# Patient Record
Sex: MALE | Race: WHITE | Employment: FULL TIME | URBAN - METROPOLITAN AREA
[De-identification: names, ages, dates, MRNs, and addresses within clinical notes are randomized per-mention and may not be internally consistent; named-entity substitution may affect disease eponyms.]

---

## 2021-04-12 ENCOUNTER — OFFICE VISIT (OUTPATIENT)
Dept: FAMILY MEDICINE CLINIC | Facility: CLINIC | Age: 41
End: 2021-04-12
Payer: COMMERCIAL

## 2021-04-12 VITALS
SYSTOLIC BLOOD PRESSURE: 136 MMHG | OXYGEN SATURATION: 97 % | HEIGHT: 66 IN | HEART RATE: 87 BPM | DIASTOLIC BLOOD PRESSURE: 70 MMHG | BODY MASS INDEX: 33.27 KG/M2 | WEIGHT: 207 LBS

## 2021-04-12 DIAGNOSIS — R63.5 WEIGHT GAIN: Primary | ICD-10-CM

## 2021-04-12 DIAGNOSIS — Z00.00 PREVENTATIVE HEALTH CARE: ICD-10-CM

## 2021-04-12 DIAGNOSIS — Z83.3 FAMILY HISTORY OF DIABETES MELLITUS (DM): ICD-10-CM

## 2021-04-12 PROCEDURE — 99204 OFFICE O/P NEW MOD 45 MIN: CPT | Performed by: INTERNAL MEDICINE

## 2021-04-12 PROCEDURE — 3078F DIAST BP <80 MM HG: CPT | Performed by: INTERNAL MEDICINE

## 2021-04-12 PROCEDURE — 3075F SYST BP GE 130 - 139MM HG: CPT | Performed by: INTERNAL MEDICINE

## 2021-04-12 PROCEDURE — 3008F BODY MASS INDEX DOCD: CPT | Performed by: INTERNAL MEDICINE

## 2021-04-17 ENCOUNTER — LAB ENCOUNTER (OUTPATIENT)
Dept: LAB | Facility: HOSPITAL | Age: 41
End: 2021-04-17
Attending: INTERNAL MEDICINE
Payer: COMMERCIAL

## 2021-04-17 DIAGNOSIS — R63.5 WEIGHT GAIN: ICD-10-CM

## 2021-04-17 DIAGNOSIS — Z00.00 PREVENTATIVE HEALTH CARE: ICD-10-CM

## 2021-04-17 PROCEDURE — 85025 COMPLETE CBC W/AUTO DIFF WBC: CPT | Performed by: INTERNAL MEDICINE

## 2021-04-17 PROCEDURE — 80061 LIPID PANEL: CPT | Performed by: INTERNAL MEDICINE

## 2021-04-17 PROCEDURE — 84439 ASSAY OF FREE THYROXINE: CPT

## 2021-04-17 PROCEDURE — 36415 COLL VENOUS BLD VENIPUNCTURE: CPT | Performed by: INTERNAL MEDICINE

## 2021-04-17 PROCEDURE — 80053 COMPREHEN METABOLIC PANEL: CPT

## 2021-04-17 PROCEDURE — 84443 ASSAY THYROID STIM HORMONE: CPT

## 2021-04-17 PROCEDURE — 83036 HEMOGLOBIN GLYCOSYLATED A1C: CPT | Performed by: INTERNAL MEDICINE

## 2021-04-28 ENCOUNTER — TELEPHONE (OUTPATIENT)
Dept: FAMILY MEDICINE CLINIC | Facility: CLINIC | Age: 41
End: 2021-04-28

## 2021-04-28 NOTE — TELEPHONE ENCOUNTER
Results were discussed with patient and he is aware he has to follow a stricter diet and exercise more, Omega 3 was also recommended and he verbalized understanding.      ----- Message from Javad Jose MD sent at 4/26/2021  8:55 AM CDT -----  Please

## 2021-04-28 NOTE — PROGRESS NOTES
[facility]  New Patient History and Physical      HPI:   Patient presents with:  Establish Care  Physical      Neymar Flores is a 42year old male presenting for:  establishment of care. Has  has no past medical history on file. 06:46 AM    CA 9.0 04/17/2021 06:46 AM    GLU 94 04/17/2021 06:46 AM    TP 7.6 04/17/2021 06:46 AM    ALB 4.0 04/17/2021 06:46 AM    ALKPHO 92 04/17/2021 06:46 AM    AST 20 04/17/2021 06:46 AM    ALT 43 04/17/2021 06:46 AM    BILT 1.0 04/17/2021 06:46 AM stool, constipation and nausea. Endocrine: Negative for cold intolerance, heat intolerance and polyuria. Genitourinary: Negative for dysuria, hematuria and urgency.    Musculoskeletal: Negative for arthralgias, back pain, gait problem, joint swelling, m Bowel sounds are normal. There is no distension. Palpations: Abdomen is soft. There is no mass. Tenderness: There is no abdominal tenderness. There is no guarding or rebound. Musculoskeletal:         General: No tenderness.  Normal range of rosy above recommendations and agrees to the above plan. Reasurrance and education provided. All questions answered.   Notified to call with any questions, complications, allergies, or worsening or changing symptoms as well as any side effects or complications

## 2024-10-14 ENCOUNTER — OFFICE VISIT (OUTPATIENT)
Dept: INTERNAL MEDICINE CLINIC | Facility: CLINIC | Age: 44
End: 2024-10-14
Payer: COMMERCIAL

## 2024-10-14 VITALS
HEIGHT: 66 IN | DIASTOLIC BLOOD PRESSURE: 88 MMHG | SYSTOLIC BLOOD PRESSURE: 136 MMHG | WEIGHT: 218 LBS | OXYGEN SATURATION: 98 % | BODY MASS INDEX: 35.03 KG/M2 | HEART RATE: 84 BPM

## 2024-10-14 DIAGNOSIS — Z00.00 HEALTH MAINTENANCE EXAMINATION: Primary | ICD-10-CM

## 2024-10-14 DIAGNOSIS — R06.83 SNORING: ICD-10-CM

## 2024-10-14 DIAGNOSIS — E66.812 CLASS 2 OBESITY DUE TO EXCESS CALORIES WITHOUT SERIOUS COMORBIDITY WITH BODY MASS INDEX (BMI) OF 35.0 TO 35.9 IN ADULT: ICD-10-CM

## 2024-10-14 DIAGNOSIS — E66.09 CLASS 2 OBESITY DUE TO EXCESS CALORIES WITHOUT SERIOUS COMORBIDITY WITH BODY MASS INDEX (BMI) OF 35.0 TO 35.9 IN ADULT: ICD-10-CM

## 2024-10-14 NOTE — PATIENT INSTRUCTIONS
PATIENT INSTRUCTIONS    Thank you for seeing me today, it was a pleasure taking care of you.  Please check out at the  and schedule a follow up appointment.  Return in about 1 year (around 10/14/2025) for physical.  Please remember that the betty period for all appointments is 5 minutes. This is to help maximize the amount of time that we can spend together at our visits.    Please get your labs drawn - you may need to schedule a lab appointment if this was not completed at your recent doctor's visit.  The following imaging studies were ordered: None  Please also follow up with the following specialists: GI - colonoscopy, pulmonology - sleep  Please fill out the advance directive information (power of  documents) and bring a copy to our clinic.  Consider flu and COVID vaccines  Focus on weight loss  Portion control - monitor how much you are eating and try to cut back.  Do what you can to make healthy choices (as an example, choose water over soda).  You can try apps such a MyFitnessPal or Weight Watchers to help you monitor your diet and exercise habits.  Exercise - increase your intensity level slowly if you do not exercise regularly. Ideally your goal should eventually be to be moving at a pace where it is hard to have a conversation with someone.    Best,   Dr. Damien [place] LABS AND IMAGING INFORMATION    Here are some lab and imaging locations available to you. Please note that some of the times and availabilities are subject to change. Please refer to the  [employer] webpage for the most recent updates. There are also additional locations that can be found on the  [employer] webpage.    To schedule a lab appointment, please call [phone].    To schedule an imaging appointment, please call [phone], option 2      [facility] - [address] [address]    Lab Hours  Monday - Friday 7:30am - 5pm  Saturday 7:30am -  4pm    X-ray Hours  Monday - Friday 8am - 8pm  Sat, Sun & holidays 8am - 4:30pm      [facility]  [address] [place] [address]    Lab Hours  Monday - Friday 6:30am - 8pm  Saturday 6:30am - 3pm  Sunday 7:30am - 4pm    X-ray Hours  Monday - Friday 7am - 8pm  Saturday 7am - 3:30pm      [facility]  [address] [place] [address]    Lab Hours  Monday - Friday 7:30am - 5pm    X-ray Hours  None at this location      [facility] & [address] [address]    Lab Hours  Lab services temporarily unavailable    X-ray Hours  Monday - Friday 8am - 4:30pm      [facility] [place]  [address] [place] [address]    Lab Hours  Monday - Friday 7:30am - 5pm  Saturday 7:30am - 4pm    X-ray Hours  Monday - Friday 8am - 8pm  Sat, Sun & holidays 8am - 4pm      [facility]  [address] [address]  [place] [address]    Lab Hours  Monday - Friday 7:30am - 4pm    X-ray Hours  Monday - Friday 8am - 4:30pm      [facility]  [address] [facility] [address]  [place] [address]    Lab Hours  Monday - Friday 8am - 4:30pm    X-ray Hours  None at this location        Quitting Smoking    Quitting smoking is the most important step you can take to improve your health. We're glad you have set a goal to improve your health.    Quit Smoking Resources    In addition to medications, use the STAR plan to help you successfully quit.   Stick with your quit date!   Tell friends, family, and coworkers your quit date. Request their understanding and support.  Anticipate and prepare for challenges. Some examples are withdrawal symptoms, being around others who smoke, and drinking alcohol.  Remove all tobacco products and paraphernalia from your environment. Make your home and  vehicles smoke-free.    Free resources for additional support:  National tobacco quitline: 1-800-QUIT-NOW (1-158.124.7613).  SmokefreeTXT is a free text program to assist you in quitting. Visit https://www.smokefree.gov/smokefreetxt for more information.  Feel free to call your care manager at ([phone]) for additional support.

## 2024-10-14 NOTE — ASSESSMENT & PLAN NOTE
Exercise and diet advised.  CBC, CMP, lipid panel, Hba1c, TSH, HIV screen, hepatitis C screen  Tdap today.  Flu shot declined  COVID vaccine advised.  Advanced directive information provided.  Colonoscopy referral provided.  Cigar smoking cessation advised

## 2024-10-14 NOTE — PROGRESS NOTES
FAMILY MEDICINE CLINIC NOTE    HPI  Walker Selby is a 44 year old male presenting for physical    #Health Maintenance  -Diet: Eats well. Balanced diet. Does have a sweet tooth. Tries not to eat out too much.   -Exercise: Active at work, some yard work.   -Lung cancer screen: Not indicated  -Colon cancer screen: Indicated  -Prostate cancer screen: Not indicated - does have family history, but prefers to think more about it at 50.   -Aortic aneurysm screen: Not indicated  -Statin:  Will check lipid panel  -ASA: Not indicated  -HIV screen: Indicated  -Hep C screen: Indicated  -Gonorrhea/chlamydia:  Not indicated  -Syphillis: Not indicated  -TB: Not indicated  -Tobacco/alcohol: Per below  -Depression: PHQ-2 score of 0 (score >/= 3 do PHQ-9)  -Advanced Directive: Indicated    #Immunizations  -Tdap: Indicated  -Flu shot: Indicated - declines  -PCV13: Not indicated   -PCV20: Not indicated   -PPSV23: Not indicated   -HPV: Not indicated  -RZV (preferred) or VZL: Not indicated   -RSV: Not indicated   -COVID: Indicated    #Snoring  S: (snoring) Yes  T: (tired/fatigued during daytime) Yes  O: (observed stop breathing/gasp @ night) Yes  P: (pressure=HTN) No  B: (BMI>35) Yes  A: (age>50) No  N: (neck size M >17 inch collar or 43cm, F >16 inch or 41cm) Yes  G: (Male) Yes  DEEPAK Screenin-8 High risk of DEEPAK      #Patient Care Team  Patient Care Team:  Michi Quezada MD as PCP - General (Family Medicine)    ROS  GENERAL: No fever/chills, no recent weight loss   HEENT: No visual changes, no changes in hearing, no sore throats  NECK: No pain, no swelling  RESP: No cough, no SOB  CV: No chest pain, no palpitations  GI: No abd pain, no N/V/D  MSK: No edema, no pain  SKIN: No new rashes  NEURO: No numbness, no tingling, no HA    HEALTH MAINTENANCE CHECKLIST  Health Maintenance Topics with due status: Overdue       Topic Date Due    Annual Physical Never done    Colorectal Cancer Screening Never done    DTaP,Tdap,and Td Vaccines  Never done    Annual Depression Screening Never done    COVID-19 Vaccine Never done    Influenza Vaccine Never done       ALLERGIES  Allergies[1]    MEDICATIONS  No current outpatient medications on file.       ACTIVE PROBLEM  Patient Active Problem List   Diagnosis    Health maintenance examination    Class 2 obesity due to excess calories without serious comorbidity with body mass index (BMI) of 35.0 to 35.9 in adult    Snoring       PAST MEDICAL HISTORY  History reviewed. No pertinent past medical history.    PAST SOCIAL HISTORY  Social History     Socioeconomic History    Marital status:      Spouse name: Not on file    Number of children: Not on file    Years of education: Not on file    Highest education level: Not on file   Occupational History    Not on file   Tobacco Use    Smoking status: Some Days     Types: Cigars    Smokeless tobacco: Never   Vaping Use    Vaping status: Never Used   Substance and Sexual Activity    Alcohol use: Yes     Comment: Occasional - once a month    Drug use: No    Sexual activity: Yes     Partners: Female   Other Topics Concern    Caffeine Concern Not Asked    Exercise Not Asked    Seat Belt Not Asked    Special Diet Not Asked    Stress Concern Not Asked    Weight Concern Not Asked   Social History Narrative    Relationships: Wife - Julia     Children: Daughter - Maggie (SARMAD STOUT), Harshil Lee     Pets: 1 Dog    School: N/A    Work:  - half on computer, half on feet - [employer]    Origin: From the [facility]. [place] background.    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     Spiritual: Raised Jewish.      Social Drivers of Health     Financial Resource Strain: Not on file   Food Insecurity: Not on file   Transportation Needs: Not on file   Physical Activity: Not on file   Stress: Not on file   Social Connections: Not on file   Housing Stability: Not on file       PAST  SURGICAL HISTORY  History reviewed. No pertinent surgical history.    PAST FAMILY HISTORY  Family History   Problem Relation Age of Onset    Heart Disease Mother         CABG    Hypertension Mother     Hyperlipidemia Mother     Other (Kidney stones) Father     No Known Problems Sister     Diabetes Maternal Grandmother     Diabetes Maternal Grandfather     Hypertension Paternal Grandmother     Diabetes Paternal Grandmother     Alcohol abuse Paternal Grandfather     Diabetes Maternal Aunt     Colon Cancer Paternal Uncle     Prostate Cancer Cousin     Breast Cancer Paternal Aunt        PHYSICAL EXAM  Vitals:    10/14/24 1345   BP: 136/88   Pulse: 84   SpO2: 98%   Weight: 218 lb (98.9 kg)   Height: 5' 6\" (1.676 m)      Body mass index is 35.19 kg/m².    GENERAL: NAD  HEENT: Moist mucous membranes, no tonsillar swelling or erythema, PERRLA bilat, TM translucent and non-bulging  NECK: Supple, non-tender  RESP: CTAB, no wheezing, no rales, no rhonchi  CV: RRR, no murmurs  GI: Soft, non-distended, non-tender, no guarding, no rebound, no masses  MSK: No edema  SKIN: Warm and dry, no rashes  NEURO: Answering questions appropriately    LABS  Lab Results   Component Value Date    WBC 8.3 04/17/2021    HGB 16.0 04/17/2021    HCT 49.1 04/17/2021    .0 04/17/2021    NEPERCENT 64.1 04/17/2021    LYPERCENT 20.8 04/17/2021    MOPERCENT 11.6 04/17/2021    EOPERCENT 2.5 04/17/2021    BAPERCENT 0.5 04/17/2021    NE 5.32 04/17/2021    LYMABS 1.73 04/17/2021    MOABSO 0.96 04/17/2021    EOABSO 0.21 04/17/2021    BAABSO 0.04 04/17/2021       Lab Results   Component Value Date     04/17/2021    K 4.2 04/17/2021     04/17/2021    CO2 30.0 04/17/2021    ANIONGAP 4 04/17/2021    BUN 15 04/17/2021    CREATSERUM 0.77 04/17/2021    BUNCREA 19.5 04/17/2021    GLU 94 04/17/2021    CA 9.0 04/17/2021    OSMOCALC 295 04/17/2021    GFRNAA 112 04/17/2021    GFRAA 129 04/17/2021    ALT 43 04/17/2021    AST 20 04/17/2021    ALKPHO 92  04/17/2021    BILT 1.0 04/17/2021    TP 7.6 04/17/2021    ALB 4.0 04/17/2021    GLOBULIN 3.6 04/17/2021    ELECTAG 1.1 04/17/2021    FASTING Yes 04/17/2021    FASTING Yes 04/17/2021         Lab Results   Component Value Date    CHOLEST 190 04/17/2021    TRIG 175 (H) 04/17/2021    HDL 38 (L) 04/17/2021     (H) 04/17/2021    VLDL 35 (H) 04/17/2021    NONHDLC 152 (H) 04/17/2021    CALCNONHDL 179 (H) 08/19/2016        DIAGNOSTICS    ASSESSMENT/PLAN  Problem List Items Addressed This Visit          Endocrine and Metabolic    Class 2 obesity due to excess calories without serious comorbidity with body mass index (BMI) of 35.0 to 35.9 in adult     Healthy diet and exercise advised.   Check labs         Relevant Orders    Comp Metabolic Panel (14)    Hemoglobin A1C    Lipid Panel    TSH W Reflex To Free T4       Sleep    Snoring     Patient with snoring and apneic episodes.  Referral to pulmonology provided         Relevant Orders    PULMONARY - INTERNAL       Health Encounters    Health maintenance examination - Primary     Exercise and diet advised.  CBC, CMP, lipid panel, Hba1c, TSH, HIV screen, hepatitis C screen  Tdap today.  Flu shot declined  COVID vaccine advised.  Advanced directive information provided.  Colonoscopy referral provided.  Cigar smoking cessation advised         Relevant Orders    CBC With Differential With Platelet    Comp Metabolic Panel (14)    HCV Antibody    Hemoglobin A1C    HIV Ag/Ab Combo    Lipid Panel    TSH W Reflex To Free T4    EE GI Telephone Colon Screen    TETANUS, DIPHTHERIA TOXOIDS AND ACELLULAR PERTUSIS VACCINE (TDAP), >7 YEARS, IM USE    Tobacco Use Counseling 3-10 Min [47991]     Tobacco cessation counseling for <3 minutes.    Return in about 1 year (around 10/14/2025) for physical.    Michi Quezada MD  Family Medicine         [1] No Known Allergies

## 2024-10-19 ENCOUNTER — LAB ENCOUNTER (OUTPATIENT)
Dept: LAB | Facility: HOSPITAL | Age: 44
End: 2024-10-19
Attending: FAMILY MEDICINE
Payer: COMMERCIAL

## 2024-10-19 DIAGNOSIS — Z00.00 HEALTH MAINTENANCE EXAMINATION: ICD-10-CM

## 2024-10-19 DIAGNOSIS — E66.812 CLASS 2 OBESITY DUE TO EXCESS CALORIES WITHOUT SERIOUS COMORBIDITY WITH BODY MASS INDEX (BMI) OF 35.0 TO 35.9 IN ADULT: ICD-10-CM

## 2024-10-19 DIAGNOSIS — E66.09 CLASS 2 OBESITY DUE TO EXCESS CALORIES WITHOUT SERIOUS COMORBIDITY WITH BODY MASS INDEX (BMI) OF 35.0 TO 35.9 IN ADULT: ICD-10-CM

## 2024-10-19 LAB
ALBUMIN SERPL-MCNC: 4.7 G/DL (ref 3.2–4.8)
ALBUMIN/GLOB SERPL: 1.6 {RATIO} (ref 1–2)
ALP LIVER SERPL-CCNC: 98 U/L
ALT SERPL-CCNC: 73 U/L
ANION GAP SERPL CALC-SCNC: 7 MMOL/L (ref 0–18)
AST SERPL-CCNC: 37 U/L (ref ?–34)
BASOPHILS # BLD AUTO: 0.04 X10(3) UL (ref 0–0.2)
BASOPHILS NFR BLD AUTO: 0.5 %
BILIRUB SERPL-MCNC: 1 MG/DL (ref 0.3–1.2)
BUN BLD-MCNC: 12 MG/DL (ref 9–23)
BUN/CREAT SERPL: 15.4 (ref 10–20)
CALCIUM BLD-MCNC: 9.5 MG/DL (ref 8.7–10.4)
CHLORIDE SERPL-SCNC: 108 MMOL/L (ref 98–112)
CHOLEST SERPL-MCNC: 210 MG/DL (ref ?–200)
CO2 SERPL-SCNC: 29 MMOL/L (ref 21–32)
CREAT BLD-MCNC: 0.78 MG/DL
DEPRECATED RDW RBC AUTO: 42.8 FL (ref 35.1–46.3)
EGFRCR SERPLBLD CKD-EPI 2021: 112 ML/MIN/1.73M2 (ref 60–?)
EOSINOPHIL # BLD AUTO: 0.18 X10(3) UL (ref 0–0.7)
EOSINOPHIL NFR BLD AUTO: 2.3 %
ERYTHROCYTE [DISTWIDTH] IN BLOOD BY AUTOMATED COUNT: 13.1 % (ref 11–15)
EST. AVERAGE GLUCOSE BLD GHB EST-MCNC: 114 MG/DL (ref 68–126)
FASTING PATIENT LIPID ANSWER: YES
FASTING STATUS PATIENT QL REPORTED: YES
GLOBULIN PLAS-MCNC: 2.9 G/DL (ref 2–3.5)
GLUCOSE BLD-MCNC: 100 MG/DL (ref 70–99)
HBA1C MFR BLD: 5.6 % (ref ?–5.7)
HCT VFR BLD AUTO: 47.6 %
HCV AB SERPL QL IA: NONREACTIVE
HDLC SERPL-MCNC: 36 MG/DL (ref 40–59)
HGB BLD-MCNC: 16.4 G/DL
IMM GRANULOCYTES # BLD AUTO: 0.04 X10(3) UL (ref 0–1)
IMM GRANULOCYTES NFR BLD: 0.5 %
LDLC SERPL CALC-MCNC: 136 MG/DL (ref ?–100)
LYMPHOCYTES # BLD AUTO: 1.72 X10(3) UL (ref 1–4)
LYMPHOCYTES NFR BLD AUTO: 22 %
MCH RBC QN AUTO: 30.4 PG (ref 26–34)
MCHC RBC AUTO-ENTMCNC: 34.5 G/DL (ref 31–37)
MCV RBC AUTO: 88.3 FL
MONOCYTES # BLD AUTO: 0.87 X10(3) UL (ref 0.1–1)
MONOCYTES NFR BLD AUTO: 11.1 %
NEUTROPHILS # BLD AUTO: 4.96 X10 (3) UL (ref 1.5–7.7)
NEUTROPHILS # BLD AUTO: 4.96 X10(3) UL (ref 1.5–7.7)
NEUTROPHILS NFR BLD AUTO: 63.6 %
NONHDLC SERPL-MCNC: 174 MG/DL (ref ?–130)
OSMOLALITY SERPL CALC.SUM OF ELEC: 298 MOSM/KG (ref 275–295)
PLATELET # BLD AUTO: 230 10(3)UL (ref 150–450)
POTASSIUM SERPL-SCNC: 4.3 MMOL/L (ref 3.5–5.1)
PROT SERPL-MCNC: 7.6 G/DL (ref 5.7–8.2)
RBC # BLD AUTO: 5.39 X10(6)UL
SODIUM SERPL-SCNC: 144 MMOL/L (ref 136–145)
TRIGL SERPL-MCNC: 208 MG/DL (ref 30–149)
TSI SER-ACNC: 2.66 MIU/ML (ref 0.55–4.78)
VLDLC SERPL CALC-MCNC: 39 MG/DL (ref 0–30)
WBC # BLD AUTO: 7.8 X10(3) UL (ref 4–11)

## 2024-10-19 PROCEDURE — 80050 GENERAL HEALTH PANEL: CPT | Performed by: FAMILY MEDICINE

## 2024-10-19 PROCEDURE — 83036 HEMOGLOBIN GLYCOSYLATED A1C: CPT | Performed by: FAMILY MEDICINE

## 2024-10-19 PROCEDURE — 87389 HIV-1 AG W/HIV-1&-2 AB AG IA: CPT | Performed by: FAMILY MEDICINE

## 2024-10-19 PROCEDURE — 86803 HEPATITIS C AB TEST: CPT | Performed by: FAMILY MEDICINE

## 2024-10-19 PROCEDURE — 80061 LIPID PANEL: CPT | Performed by: FAMILY MEDICINE

## 2024-10-21 PROBLEM — R74.8 ELEVATED LIVER ENZYMES: Status: ACTIVE | Noted: 2024-10-21

## 2024-10-21 PROBLEM — E78.5 HYPERLIPIDEMIA: Status: ACTIVE | Noted: 2024-10-21

## 2024-10-28 ENCOUNTER — NURSE ONLY (OUTPATIENT)
Facility: CLINIC | Age: 44
End: 2024-10-28

## 2024-10-28 DIAGNOSIS — Z12.11 SCREEN FOR COLON CANCER: Primary | ICD-10-CM

## 2024-10-28 DIAGNOSIS — Z80.0 FAMILY HISTORY OF COLON CANCER: ICD-10-CM

## 2024-10-28 NOTE — PROGRESS NOTES
Called patient for scheduled TCS.   Medications, pharmacy, and allergies reviewed.   Age 45-76 y/o: Yes  › MD preference: None  › Insurance:  BC HMO  › Last PCP visit: 10/14/2024  Pcp within [facility]: Dr. Quezada  › Last CBC: 10/19/2024  › Date of positive FIT: N/A  › H/W/BMI: 5'6 / 218 lb / 35.2 BMI    Special comments/notes: None  Telephone Colon Screening Questionnaire Yes No   Are you currently experiencing any new/abnormal GI symptoms? [] [x]   If yes, explain:     Rectal bleeding? [] [x]   Black stool? [] [x]   Dysphagia or food \"feeling stuck\" when eating? [] [x]   Intractable vomiting? [] [x]   Unexplained weight loss? [] [x]   First colonoscopy? [x] []   Family history of colon cancer?  Paternal uncle  [x] []   Any issues with anesthesia? [] [x]   If yes, explain:      Any recent complaints of chest pain or shortness of breath?  [] [x]   Referred to a cardiologist?  [] [x]   If yes, explain:      Stroke, MI (heart attack) or stent placement in the last 12 months:  [] [x]   History of  respiratory issues/oxygen/DEEPAK/COPD: [] [x]   If yes, CPAP/BiPAP:     History of devices (pacemaker/defibrillator) [] [x]   History of cardiac/CVA/(MI/stroke): [] [x]     Medications Yes  No   Anticoagulants (except Aspirin):  [] [x]   Diabetic Meds  PO: Hold day before and day of procedure  Insulin:  [] [x]   Weight loss meds (phentermine/vyvanse/saxsenda/etc): [] [x]   Iron/herbal/multivitamin supplement: multivitamin [x] []   Usage of marijuana, CBD &/or vape products: [] [x]

## 2024-10-28 NOTE — PROGRESS NOTES
GI Staff:  TCS Colon Screening Orders    Please schedule: Colonoscopy [address] with MAC    Please send split dose Golytely bowel prep - needs to be sent once scheduled     Diagnosis: Colon Screening Z12.11 / Family history of colon cancer Z80.0     Medication adjustments: Hold vitamins/supplements for 7 days prior to procedure.      >>>Please inform patient if new medications are started after scheduling procedure they need to call clinic to notify us.

## 2024-10-29 NOTE — PROGRESS NOTES
Tamica Villafuerte's        Please send prep       [id number] - [place], [place] - [address] AT [facility], [phone], [phone]   [address] [place] [address]   Phone: [phone] Fax: [phone]

## 2024-10-29 NOTE — PROGRESS NOTES
Scheduled for:  Colonoscopy [id number]  Provider Name:    Date:  Tues, 02/25/2025  Location:  [facility]  Sedation:  MAC  Time:  845AM (pt is aware Endo will call with arrival time     Prep:  Golytely   Meds/Allergies Reconciled?:  Yes    Diagnosis with codes:  Colon Screening Z12.11 / Family history of colon cancer Z80.0   Was patient informed to call insurance with codes (Y/N): Yes      Referral sent?:  Referral was sent at the time of electronic surgical scheduling.    [facility] or [facility] notified?:  I sent an electronic request to Endo Scheduling and received a confirmation today.       Medication Orders:  None  Misc Orders:  None     Further instructions given by staff:  I discussed the prep instructions with the patient which he verbally understood and is aware that I will send the instructions today.via [employer]

## 2025-02-18 RX ORDER — MULTIVITAMIN
1 TABLET ORAL DAILY
COMMUNITY

## 2025-02-25 ENCOUNTER — ANESTHESIA (OUTPATIENT)
Dept: ENDOSCOPY | Facility: HOSPITAL | Age: 45
End: 2025-02-25
Payer: COMMERCIAL

## 2025-02-25 ENCOUNTER — HOSPITAL ENCOUNTER (OUTPATIENT)
Facility: HOSPITAL | Age: 45
Setting detail: HOSPITAL OUTPATIENT SURGERY
Discharge: HOME | End: 2025-02-25
Attending: INTERNAL MEDICINE | Admitting: INTERNAL MEDICINE
Payer: COMMERCIAL

## 2025-02-25 ENCOUNTER — ANESTHESIA EVENT (OUTPATIENT)
Dept: ENDOSCOPY | Facility: HOSPITAL | Age: 45
End: 2025-02-25
Payer: COMMERCIAL

## 2025-02-25 DIAGNOSIS — Z80.0 FAMILY HISTORY OF COLON CANCER: ICD-10-CM

## 2025-02-25 DIAGNOSIS — Z12.11 SCREEN FOR COLON CANCER: ICD-10-CM

## 2025-02-25 PROBLEM — K63.5 POLYP OF COLON: Status: ACTIVE | Noted: 2025-02-25

## 2025-02-25 PROBLEM — K64.8 INTERNAL HEMORRHOIDS: Status: ACTIVE | Noted: 2025-02-25

## 2025-02-25 PROCEDURE — 45385 COLONOSCOPY W/LESION REMOVAL: CPT | Performed by: INTERNAL MEDICINE

## 2025-02-25 PROCEDURE — 0DBH8ZX EXCISION OF CECUM, VIA NATURAL OR ARTIFICIAL OPENING ENDOSCOPIC, DIAGNOSTIC: ICD-10-PCS | Performed by: INTERNAL MEDICINE

## 2025-02-25 PROCEDURE — 0DBK8ZX EXCISION OF ASCENDING COLON, VIA NATURAL OR ARTIFICIAL OPENING ENDOSCOPIC, DIAGNOSTIC: ICD-10-PCS | Performed by: INTERNAL MEDICINE

## 2025-02-25 PROCEDURE — 0DBL8ZX EXCISION OF TRANSVERSE COLON, VIA NATURAL OR ARTIFICIAL OPENING ENDOSCOPIC, DIAGNOSTIC: ICD-10-PCS | Performed by: INTERNAL MEDICINE

## 2025-02-25 RX ORDER — HYDROMORPHONE HYDROCHLORIDE 1 MG/ML
0.2 INJECTION, SOLUTION INTRAMUSCULAR; INTRAVENOUS; SUBCUTANEOUS EVERY 5 MIN PRN
Status: DISCONTINUED | OUTPATIENT
Start: 2025-02-25 | End: 2025-02-25

## 2025-02-25 RX ORDER — HYDROMORPHONE HYDROCHLORIDE 1 MG/ML
0.6 INJECTION, SOLUTION INTRAMUSCULAR; INTRAVENOUS; SUBCUTANEOUS EVERY 5 MIN PRN
Status: DISCONTINUED | OUTPATIENT
Start: 2025-02-25 | End: 2025-02-25

## 2025-02-25 RX ORDER — SODIUM CHLORIDE, SODIUM LACTATE, POTASSIUM CHLORIDE, CALCIUM CHLORIDE 600; 310; 30; 20 MG/100ML; MG/100ML; MG/100ML; MG/100ML
INJECTION, SOLUTION INTRAVENOUS CONTINUOUS
Status: DISCONTINUED | OUTPATIENT
Start: 2025-02-25 | End: 2025-02-25

## 2025-02-25 RX ORDER — MORPHINE SULFATE 4 MG/ML
4 INJECTION, SOLUTION INTRAMUSCULAR; INTRAVENOUS EVERY 10 MIN PRN
Status: DISCONTINUED | OUTPATIENT
Start: 2025-02-25 | End: 2025-02-25

## 2025-02-25 RX ORDER — MORPHINE SULFATE 4 MG/ML
2 INJECTION, SOLUTION INTRAMUSCULAR; INTRAVENOUS EVERY 10 MIN PRN
Status: DISCONTINUED | OUTPATIENT
Start: 2025-02-25 | End: 2025-02-25

## 2025-02-25 RX ORDER — NALOXONE HYDROCHLORIDE 0.4 MG/ML
0.08 INJECTION, SOLUTION INTRAMUSCULAR; INTRAVENOUS; SUBCUTANEOUS AS NEEDED
Status: DISCONTINUED | OUTPATIENT
Start: 2025-02-25 | End: 2025-02-25

## 2025-02-25 RX ORDER — HYDROMORPHONE HYDROCHLORIDE 1 MG/ML
0.4 INJECTION, SOLUTION INTRAMUSCULAR; INTRAVENOUS; SUBCUTANEOUS EVERY 5 MIN PRN
Status: DISCONTINUED | OUTPATIENT
Start: 2025-02-25 | End: 2025-02-25

## 2025-02-25 RX ORDER — MORPHINE SULFATE 10 MG/ML
6 INJECTION, SOLUTION INTRAMUSCULAR; INTRAVENOUS EVERY 10 MIN PRN
Status: DISCONTINUED | OUTPATIENT
Start: 2025-02-25 | End: 2025-02-25

## 2025-02-25 RX ADMIN — SODIUM CHLORIDE, SODIUM LACTATE, POTASSIUM CHLORIDE, CALCIUM CHLORIDE: 600; 310; 30; 20 INJECTION, SOLUTION INTRAVENOUS at 07:33:00

## 2025-02-25 NOTE — H&P
History & Physical Examination    Patient Name: Walker Selby  MRN: [id number]  CSN: [id number]  YOB: 1980    Diagnosis: screening for colon cancer    Prescriptions Prior to Admission[1]  Current Facility-Administered Medications   Medication Dose Route Frequency    lactated ringers infusion   Intravenous Continuous       Allergies: Allergies[2]    Past Medical History:    Hyperlipidemia    Visual impairment    contacts     History reviewed. No pertinent surgical history.  Family History   Problem Relation Age of Onset    Heart Disease Mother         CABG    Hypertension Mother     Hyperlipidemia Mother     Other (Kidney stones) Father     No Known Problems Sister     Diabetes Maternal Grandmother     Diabetes Maternal Grandfather     Hypertension Paternal Grandmother     Diabetes Paternal Grandmother     Alcohol abuse Paternal Grandfather     Diabetes Maternal Aunt     Colon Cancer Paternal Uncle     Prostate Cancer Cousin     Breast Cancer Paternal Aunt      Social History     Tobacco Use    Smoking status: Some Days     Types: Cigars    Smokeless tobacco: Never   Substance Use Topics    Alcohol use: Yes     Alcohol/week: 2.0 standard drinks of alcohol     Types: 2 Standard drinks or equivalent per week     Comment: Occasional - once a month       SYSTEM Check if Review is Normal Check if Physical Exam is Normal If not normal, please explain:   HEENT [X ] [ X]    NECK  [X ] [ X]    HEART [X ] [ X]    LUNGS [X ] [ X]    ABDOMEN [X ] [ X]    EXTREMITIES [X ] [ X]    OTHER        I have discussed the risks and benefits and alternatives of the procedure with the patient/family.  They understand and agree to proceed with plan of care.   I have reviewed the History and Physical done within the last 30 days.  Any changes noted above.    Mishel Taylor MD  [facility]                   [1]   Medications Prior to Admission   Medication Sig Dispense Refill Last Dose/Taking     Multiple Vitamin (ONE-DAILY MULTI VITAMINS) Oral Tab Take 1 tablet by mouth daily.   2/11/2025   [2] No Known Allergies

## 2025-02-25 NOTE — ANESTHESIA PREPROCEDURE EVALUATION
Anesthesia PreOp Note    HPI:     Walker Selby is a 45 year old male who presents for preoperative consultation requested by: Mishel Taylor MD    Date of Surgery: 2/25/2025    Procedure(s):  COLONOSCOPY  Indication: Screen for colon cancer / Family history of colon cancer    Relevant Problems   No relevant active problems       NPO:  Last Liquid Consumption Date: 02/25/25  Last Liquid Consumption Time: 0330  Last Solid Consumption Date: 02/24/25  Last Solid Consumption Time: 0900  Last Liquid Consumption Date: 02/25/25          History Review:  Patient Active Problem List    Diagnosis Date Noted    Elevated liver enzymes 10/21/2024    Hyperlipidemia 10/21/2024    Health maintenance examination 10/14/2024    Class 2 obesity due to excess calories without serious comorbidity with body mass index (BMI) of 35.0 to 35.9 in adult 10/14/2024    Snoring 10/14/2024       Past Medical History:    Hyperlipidemia    Visual impairment    contacts       History reviewed. No pertinent surgical history.    Prescriptions Prior to Admission[1]  Current Medications and Prescriptions Ordered in Epic[2]    Allergies[3]    Family History   Problem Relation Age of Onset    Heart Disease Mother         CABG    Hypertension Mother     Hyperlipidemia Mother     Other (Kidney stones) Father     No Known Problems Sister     Diabetes Maternal Grandmother     Diabetes Maternal Grandfather     Hypertension Paternal Grandmother     Diabetes Paternal Grandmother     Alcohol abuse Paternal Grandfather     Diabetes Maternal Aunt     Colon Cancer Paternal Uncle     Prostate Cancer Cousin     Breast Cancer Paternal Aunt      Social History     Socioeconomic History    Marital status:    Tobacco Use    Smoking status: Some Days     Types: Cigars    Smokeless tobacco: Never   Vaping Use    Vaping status: Never Used   Substance and Sexual Activity    Alcohol use: Yes     Alcohol/week: 2.0 standard drinks of alcohol     Types: 2  Standard drinks or equivalent per week     Comment: Occasional - once a month    Drug use: No    Sexual activity: Yes     Partners: Female       Available pre-op labs reviewed.             Vital Signs:  Body mass index is 35.19 kg/m².   height is 1.676 m (5' 6\") and weight is 98.9 kg (218 lb). His blood pressure is 147/88 and his pulse is 94. His respiration is 23 and oxygen saturation is 95%.   Vitals:    02/18/25 1540 02/25/25 0646   BP:  147/88   Pulse:  94   Resp:  23   SpO2:  95%   Weight: 98.9 kg (218 lb)    Height: 1.676 m (5' 6\")         Anesthesia Evaluation     Patient summary reviewed and Nursing notes reviewed    Airway   Mallampati: III  TM distance: <3 FB  Neck ROM: full  Dental      Pulmonary - normal exam   (+) sleep apnea  Cardiovascular - negative ROS and normal exam  Exercise tolerance: good    ROS comment: HLD    Neuro/Psych - negative ROS     GI/Hepatic/Renal - negative ROS     Endo/Other - negative ROS   Abdominal   (+) obese                 Anesthesia Plan:   ASA:  2  Plan:   MAC  Post-op Pain Management: IV analgesics  Informed Consent Plan and Risks Discussed With:  Patient      I have informed Walker Selby and/or legal guardian or family member of the nature of the anesthetic plan, benefits, risks including possible dental damage if relevant, major complications, and any alternative forms of anesthetic management.   All of the patient's questions were answered to the best of my ability. The patient desires the anesthetic management as planned.  Jaya Warner MD  2/25/2025 7:34 AM  Present on Admission:  **None**           [1]   Medications Prior to Admission   Medication Sig Dispense Refill Last Dose/Taking    Multiple Vitamin (ONE-DAILY MULTI VITAMINS) Oral Tab Take 1 tablet by mouth daily.   2/11/2025   [2]   Current Facility-Administered Medications Ordered in Epic   Medication Dose Route Frequency Provider Last Rate Last Admin    lactated ringers infusion   Intravenous Continuous  Mishel Taylor MD 20 mL/hr at 02/25/25 0653 New Bag at 02/25/25 0653     No current Epic-ordered outpatient medications on file.   [3] No Known Allergies

## 2025-02-25 NOTE — OPERATIVE REPORT
COLONOSCOPY REPORT    Walker Selby     1980 Age 45 year old   PCP Michi Quezada MD Endoscopist Mishel Taylor MD       Date of procedure: 25    Procedure: Colonoscopy w/ cold snare polypectomy     Pre-operative diagnosis: CRC screening     Post-operative diagnosis: colon polyps, hemorrhoids     Medications: MAC    Withdrawal time: 15 minutes    Procedure:  Informed consent was obtained from the patient after the risks of the procedure were discussed, including but not limited to bleeding, perforation, aspiration, infection, or possibility of a missed lesion. After discussions of the risks/benefits and alternatives to this procedure, as well as the planned sedation, the patient was placed in the left lateral decubitus position and begun on continuous blood pressure pulse oximetry and EKG monitoring and this was maintained throughout the procedure. Once an adequate level of sedation was obtained a digital rectal exam was completed. Then the lubricated tip of the [id number] diagnostic video colonoscope was inserted and advanced without difficulty to the cecum using water immersion and CO2 insufflation technique. The cecum was identified by localizing the trifold, the appendix and the ileocecal valve. Withdrawal was begun with thorough washing and careful examination of the colonic walls and folds. A routine second examination of the cecum/ascending colon was performed. Photodocumentation was obtained. The bowel prep was excellent. Views of the colon were excellent with washing. I then carefully withdrew the instrument from the patient who tolerated the procedure well.     Complications: none.    Findings:   1. Three polyps noted as follows:      A. 4 mm polyp in the cecum of the colon; sessile morphology; cold snare polypectomy performed, polyp retrieved.      B. 4 mm polyp in the ascending colon; sessile morphology; cold snare polypectomy performed, polyp retrieved.      C. 4 mm polyp in  the transverse colon; sessile morphology; cold snare polypectomy performed, polyp retrieved.    2. Diverticulosis: none.    3. Ileocecal valve appeared healthy and normal. The examined portion of the terminal ileum appeared normal.     4. The colonic mucosa throughout the colon showed normal vascular pattern, without evidence of angioectasias or inflammation.     5. A retroflexed view of the rectum revealed small internal hemorrhoids.    6. JOSSIE: normal rectal tone, no masses palpated.     Impression:   Normal terminal ileum.   Three small (< 5 mm) polyps removed.   Small internal hemorrhoids.   The colon was otherwise normal with glistening mucosa and intact vascular pattern.    Recommend:  Await pathology. The interval for the next colonoscopy will be determined after reviewing pathology. If new signs or symptoms develop, colonoscopy may need to be repeated sooner.   High fiber diet.  Monitor for blood in the stool. If having more than just tinge of blood, call office or go to the ER.  Avoid NSAIDs (motrin, ibuprofen, aleve, advil, naproxen, midol, naprosyn, excedrin) for 14 days.     >>>If tissue was obtained and you have not received your pathology results either by phone or letter within 2 weeks, please call our office at [phone].    Specimens: colon polyps     Blood loss: <1 ml

## 2025-02-25 NOTE — DISCHARGE INSTRUCTIONS
Home Care Instructions for Colonoscopy  Diet:  - Resume your regular diet as tolerated unless otherwise instructed.  - Start with light meals to minimize bloating.  - Do not drink alcohol today.    Medication:  - If you have questions about resuming your normal medications, please contact your Primary Care Physician.    Activities:  - Take it easy today. Do not return to work today.  - Do not drive today.  - Do not operate any machinery today (including kitchen equipment).    Colonoscopy:  - You may notice some rectal \"spotting\" (a little blood on the toilet tissue) for a day or two after the exam. This is normal.  - If you experience any rectal bleeding (not spotting), persistent tenderness or sharp severe abdominal pains, oral temperature over 100 degrees Fahrenheit, light-headedness or dizziness, or any other problems, contact your doctor.      **If unable to reach your doctor, please go to the [facility] Emergency Room**    - Your referring physician will receive a full report of your examination.  - If you do not hear from your doctor's office within two weeks of your biopsy, please call them for your results.    You may be able to see your laboratory results in [place] between 4 and 7 business days.  In some cases, your physician may not have viewed the results before they are released to [place].  If you have questions regarding your results contact the physician who ordered the test/exam by phone or via [place] by choosing \"Ask a Medical Question.\"

## 2025-02-25 NOTE — ANESTHESIA POSTPROCEDURE EVALUATION
Patient: Walker Selby    Procedure Summary       Date: 02/25/25 Room / Location: [facility] ENDOSCOPY 01 / [facility] ENDOSCOPY    Anesthesia Start: 0732 Anesthesia Stop: 0802    Procedure: COLONOSCOPY Diagnosis:       Screen for colon cancer      Family history of colon cancer      (polyps, hemorrhoids)    Surgeons: Mishel Taylor MD Anesthesiologist: Jaya Warner MD    Anesthesia Type: MAC ASA Status: 2            Anesthesia Type: MAC    Vitals Value Taken Time   /58 02/25/25 0800   Temp 97.6 02/25/25 0802   Pulse 69 02/25/25 0802   Resp 20 02/25/25 0802   SpO2 99 % 02/25/25 0802   Vitals shown include unfiled device data.    [facility] AN Post Evaluation:   Patient Evaluated in PACU  Patient Participation: complete - patient participated  Level of Consciousness: awake and alert  Pain Score: 0  Pain Management: adequate  Airway Patency:patent  Yes    Nausea/Vomiting: none  Cardiovascular Status: acceptable  Respiratory Status: acceptable  Postoperative Hydration acceptable      Jaya Warner MD  2/25/2025 8:02 AM

## 2025-02-26 VITALS
DIASTOLIC BLOOD PRESSURE: 72 MMHG | SYSTOLIC BLOOD PRESSURE: 122 MMHG | WEIGHT: 218 LBS | OXYGEN SATURATION: 93 % | HEIGHT: 66 IN | BODY MASS INDEX: 35.03 KG/M2 | RESPIRATION RATE: 21 BRPM | HEART RATE: 65 BPM

## 2025-02-26 NOTE — PROGRESS NOTES
GI Staff:    Can you please place recall for this patient to have a colonoscopy in 3 years.    Thank you,  Mishel

## 2025-02-28 ENCOUNTER — TELEPHONE (OUTPATIENT)
Facility: CLINIC | Age: 45
End: 2025-02-28

## 2025-02-28 NOTE — TELEPHONE ENCOUNTER
Health Maintenance Updated.    3 year colonoscopy recall entered into patient outreach in [facility].  Next colonoscopy will be due 2/25/2028.

## 2025-02-28 NOTE — TELEPHONE ENCOUNTER
----- Message from Mishel Taylor sent at 2/26/2025 11:10 AM CST -----  GI Staff:    Can you please place recall for this patient to have a colonoscopy in 3 years.    Thank you,  Mishel

## (undated) DEVICE — 60 ML SYRINGE REGULAR TIP: Brand: MONOJECT

## (undated) DEVICE — Device

## (undated) DEVICE — KIT CLEAN ENDOKIT 1.1OZ GOWNX2

## (undated) DEVICE — V2 SPECIMEN COLLECTION MANIFOLD KIT: Brand: NEPTUNE

## (undated) DEVICE — KIT ENDO ORCAPOD 160/180/190

## (undated) DEVICE — SINGLE-USE SNARE: Brand: CAPTIVATOR™ COLD

## (undated) NOTE — LETTER
[facility]  Administration of Anesthesia  IWalker agree to be cared for by a physician anesthesiologist alone and/or with a nurse anesthetist, who is specially trained to monitor me and give me medicine to put me to sleep or keep me comfortable during my procedure    I understand that my anesthesiologist and/or anesthetist is not an employee or agent of [facility] or [employer]. He or she works for [facility]    As the patient asking for anesthesia services, I agree to:  Allow the anesthesiologist (anesthesia doctor) to give me medicine and do additional procedures as necessary. Some examples are: Starting or using an “IV” to give me medicine, fluids or blood during my procedure, and having a breathing tube placed to help me breathe when I’m asleep (intubation). In the event that my heart stops working properly, I understand that my anesthesiologist will make every effort to sustain my life, unless otherwise directed by [facility] Do Not Resuscitate documents.  Tell my anesthesia doctor before my procedure:  If I am pregnant.  The last time that I ate or drank.  iii. All of the medicines I take (including prescriptions, herbal supplements, and pills I can buy without a prescription (including street drugs/illegal medications). Failure to inform my anesthesiologist about these medicines may increase my risk of anesthetic complications.  iv.If I am allergic to anything or have had a reaction to anesthesia before.  I understand how the anesthesia medicine will help me (benefits).  I understand that with any type of anesthesia medicine there are risks:  The most common risks are: nausea, vomiting, sore throat, muscle soreness, damage to my eyes, mouth, or teeth (from breathing tube placement).  Rare risks include: remembering what happened during my procedure, allergic reactions to medications, injury to my airway, heart, lungs, vision, nerves, or  muscles and in extremely rare instances death.  My doctor has explained to me other choices available to me for my care (alternatives).  Pregnant Patients (“epidural”):  I understand that the risks of having an epidural (medicine given into my back to help control pain during labor), include itching, low blood pressure, difficulty urinating, headache or slowing of the baby’s heart. Very rare risks include infection, bleeding, seizure, irregular heart rhythms and nerve injury.  Regional Anesthesia (“spinal”, “epidural”, & “nerve blocks”):  I understand that rare but potential complications include headache, bleeding, infection, seizure, irregular heart rhythms, and nerve injury.    _____________________________________________________________________________  Patient (or Representative) Signature/Relationship to Patient  Date   Time    _____________________________________________________________________________   Name (if used)    Language/Organization   Time    _____________________________________________________________________________  Nurse Anesthetist Signature     Date   Time  _____________________________________________________________________________  Anesthesiologist Signature     Date   Time  I have discussed the procedure and information above with the patient (or patient’s representative) and answered their questions. The patient or their representative has agreed to have anesthesia services.    _____________________________________________________________________________  Witness        Date   Time  I have verified that the signature is that of the patient or patient’s representative, and that it was signed before the procedure  Patient Name: Walker Selby     : 1980                 Printed: 2025 at 7:53 AM    Medical Record #: [id number]                                            Page 1 of 1  ----------ANESTHESIA CONSENT----------

## (undated) NOTE — LETTER
[place] [address], [place], [place]    Authorization for Surgical Operation and Procedure                               I hereby authorize Mishel Taylor MD, my physician and his/her assistants (if applicable), which may include medical students, residents, and/or fellows, to perform the following surgical operation/ procedure and administer such anesthesia as may be determined necessary by my physician: Operation/Procedure name (s) COLONOSCOPY on Walker Selby   2.   I recognize that during the surgical operation/procedure, unforeseen conditions may necessitate additional or different procedures than those listed above.  I, therefore, further authorize and request that the above-named surgeon, assistants, or designees perform such procedures as are, in their judgment, necessary and desirable.    3.   My surgeon/physician has discussed prior to my surgery the potential benefits, risks and side effects of this procedure; the likelihood of achieving goals; and potential problems that might occur during recuperation.  They also discussed reasonable alternatives to the procedure, including risks, benefits, and side effects related to the alternatives and risks related to not receiving this procedure.  I have had all my questions answered and I acknowledge that no guarantee has been made as to the result that may be obtained.    4.   Should the need arise during my operation/procedure, which includes change of level of care prior to discharge, I also consent to the administration of blood and/or blood products.  Further, I understand that despite careful testing and screening of blood or blood products by collecting agencies, I may still be subject to ill effects as a result of receiving a blood transfusion and/or blood products.  The following are some, but not all, of the potential risks that can occur: fever and allergic reactions, hemolytic reactions, transmission of  diseases such as Hepatitis, AIDS and Cytomegalovirus (CMV) and fluid overload.  In the event that I wish to have an autologous transfusion of my own blood, or a directed donor transfusion, I will discuss this with my physician.  Check only if Refusing Blood or Blood Products  I understand refusal of blood or blood products as deemed necessary by my physician may have serious consequences to my condition to include possible death. I hereby assume responsibility for my refusal and release the hospital, its personnel, and my physicians from any responsibility for the consequences of my refusal.    o  Refuse   5.   I authorize the use of any specimen, organs, tissues, body parts or foreign objects that may be removed from my body during the operation/procedure for diagnosis, research or teaching purposes and their subsequent disposal by hospital authorities.  I also authorize the release of specimen test results and/or written reports to my treating physician on the hospital medical staff or other referring or consulting physicians involved in my care, at the discretion of the Pathologist or my treating physician.    6.   I consent to the photographing or videotaping of the operations or procedures to be performed, including appropriate portions of my body for medical, scientific, or educational purposes, provided my identity is not revealed by the pictures or by descriptive texts accompanying them.  If the procedure has been photographed/videotaped, the surgeon will obtain the original picture, image, videotape or CD.  The hospital will not be responsible for storage, release or maintenance of the picture, image, tape or CD.    7.   I consent to the presence of a  or observers in the operating room as deemed necessary by my physician or their designees.    8.   I recognize that in the event my procedure results in extended X-Ray/fluoroscopy time, I may develop a skin reaction.    9. If I have a Do Not  Attempt Resuscitation (DNAR) order in place, that status will be suspended while in the operating room, procedural suite, and during the recovery period unless otherwise explicitly stated by me (or a person authorized to consent on my behalf). The surgeon or my attending physician will determine when the applicable recovery period ends for purposes of reinstating the DNAR order.  10. Patients having a sterilization procedure: I understand that if the procedure is successful the results will be permanent and it will therefore be impossible for me to inseminate, conceive, or bear children.  I also understand that the procedure is intended to result in sterility, although the result has not been guaranteed.   11. I acknowledge that my physician has explained sedation/analgesia administration to me including the risk and benefits I consent to the administration of sedation/analgesia as may be necessary or desirable in the judgment of my physician.    I CERTIFY THAT I HAVE READ AND FULLY UNDERSTAND THE ABOVE CONSENT TO OPERATION and/or OTHER PROCEDURE.     ____________________________________  _________________________________        ______________________________  Signature of Patient    Signature of Responsible Person                Printed Name of Responsible Person                                      ____________________________________  _____________________________                ________________________________  Signature of Witness        Date  Time         Relationship to Patient    STATEMENT OF PHYSICIAN My signature below affirms that prior to the time of the procedure; I have explained to the patient and/or his/her legal representative, the risks and benefits involved in the proposed treatment and any reasonable alternative to the proposed treatment. I have also explained the risks and benefits involved in refusal of the proposed treatment and alternatives to the proposed treatment and have answered the  patient's questions. If I have a significant financial interest in a co-management agreement or a significant financial interest in any product or implant, or other significant relationship used in this procedure/surgery, I have disclosed this and had a discussion with my patient.     _____________________________________________________              _____________________________  (Signature of Physician)                                                                                         (Date)                                   (Time)  Patient Name: Walker Selby      : 1980      Printed: 2025     Medical Record #: [id number]                                      Page 1 of 1